# Patient Record
Sex: MALE | Race: WHITE | Employment: OTHER | URBAN - METROPOLITAN AREA
[De-identification: names, ages, dates, MRNs, and addresses within clinical notes are randomized per-mention and may not be internally consistent; named-entity substitution may affect disease eponyms.]

---

## 2019-09-23 ENCOUNTER — APPOINTMENT (OUTPATIENT)
Dept: CT IMAGING | Facility: CLINIC | Age: 44
End: 2019-09-23
Attending: EMERGENCY MEDICINE
Payer: COMMERCIAL

## 2019-09-23 ENCOUNTER — HOSPITAL ENCOUNTER (EMERGENCY)
Facility: CLINIC | Age: 44
Discharge: HOME | End: 2019-09-24
Attending: EMERGENCY MEDICINE | Admitting: EMERGENCY MEDICINE
Payer: COMMERCIAL

## 2019-09-23 VITALS
OXYGEN SATURATION: 97 % | DIASTOLIC BLOOD PRESSURE: 87 MMHG | BODY MASS INDEX: 25.92 KG/M2 | SYSTOLIC BLOOD PRESSURE: 134 MMHG | RESPIRATION RATE: 18 BRPM | WEIGHT: 175 LBS | HEIGHT: 69 IN | TEMPERATURE: 97.8 F

## 2019-09-23 DIAGNOSIS — S09.90XA INJURY OF HEAD, INITIAL ENCOUNTER: ICD-10-CM

## 2019-09-23 DIAGNOSIS — S06.0X1A CONCUSSION WITH LOSS OF CONSCIOUSNESS OF 30 MINUTES OR LESS, INITIAL ENCOUNTER: ICD-10-CM

## 2019-09-23 PROCEDURE — 99284 EMERGENCY DEPT VISIT MOD MDM: CPT | Mod: 25

## 2019-09-23 PROCEDURE — 70450 CT HEAD/BRAIN W/O DYE: CPT

## 2019-09-23 ASSESSMENT — MIFFLIN-ST. JEOR: SCORE: 1669.17

## 2019-09-23 NOTE — ED AVS SNAPSHOT
[facility]  [address]  [facility] [address]  Phone:  [phone]  Fax:  [phone]                                    Oleg Long   MRN: [id number]    Department:  [facility] Emergency Department   Date of Visit:  9/23/2019           After Visit Summary Signature Page    I have received my discharge instructions, and my questions have been answered. I have discussed any challenges I see with this plan with the nurse or doctor.    ..........................................................................................................................................  Patient/Patient Representative Signature      ..........................................................................................................................................  Patient Representative Print Name and Relationship to Patient    ..................................................               ................................................  Date                                   Time    ..........................................................................................................................................  Reviewed by Signature/Title    ...................................................              ..............................................  Date                                               Time          22EPIC Rev 08/18

## 2019-09-24 NOTE — ED PROVIDER NOTES
"  History   Chief Complaint:  Head Injury    HPI   Oleg Long is a 44 year old male, current smokeless tobacco user who presents for evaluation after a head injury. The patient reports that two days ago while playing rugby he was hit in the temple on the right side of his head by another player's knee. The patient was not wearing a helmet. He states that he lost consciousness for about one minute at that time. He did not play for the rest of the weekend and took time to rest. After the incident he endorses developing a headache and neck pain. The patient returned to work today but notes feeling foggy, dizzy, confused, and forgetful. The patient is most concerned by these cognitive symptoms, prompting his evaluation this evening. Initially after the injury the patient took ibuprofen for the pain but switched to Tylenol today.  Notably, the patient did sustain a mild concussion two weeks ago. He is a current user of chewing tobacco but denies alcohol or street drug use. The patient further denies vomiting, and focal numbness or tingling.    Allergies:  No known drug allergies    Medications:    Wellbutrin  Celexa    Past Medical History:    Depression  Concussion    Past Surgical History:    History reviewed. No pertinent surgical history.    Family History:    History reviewed. No pertinent family history.     Social History:  Smoking status: No  Smokeless tobacco: Current user  Alcohol use: No     Review of Systems   All other systems reviewed and are negative.    Physical Exam     Patient Vitals for the past 24 hrs:   BP Temp Temp src Heart Rate Resp SpO2 Height Weight   09/23/19 2300 134/87 -- -- -- -- 97 % -- --   09/23/19 2115 (!) 141/86 -- -- -- -- 98 % -- --   09/23/19 1931 (!) 137/100 97.8  F (36.6  C) Temporal 62 18 100 % 1.753 m (5' 9\") 79.4 kg (175 lb)     Physical Exam  General: Resting on the bed.  Head: No obvious trauma to head.  Ears, Nose, Throat:  External ears normal.  Nose normal.  Clear " TMs  Eyes:  Conjunctivae clear.  Pupils are equal, round, and reactive.   Neck: Normal range of motion.  Neck supple.  non tender c spine.    CV: Regular rate and rhythm.  No murmurs.      Respiratory: Effort normal and breath sounds normal.  No wheezing or crackles.   Gastrointestinal: Soft.  No distension. There is no tenderness.    Neuro: Alert. Moving all extremities appropriately.  Normal speech.  CN II-XII grossly intact, no pronator drift, normal finger-nose-finger, visual fields intact.  Gross muscle strength intact of the proximal and distal bilateral upper and lower extremities.  Sensation intact to light touch in all 4 extremities.  2+ patellar reflexes.  Normal gait.    Skin: Skin is warm and dry.  No rash noted.     Emergency Department Course   Imaging:  Radiographic findings were communicated with the patient who voiced understanding of the findings.    Head CT w/o Contrast:  Normal head CT.  As read by Radiology.    Emergency Department Course:  Past medical records, nursing notes, and vitals reviewed.   2210: I performed an exam of the patient and obtained history, as documented above.    The patient was sent for a head CT while in the emergency department, findings above.    1352: I rechecked the patient. Explained findings to the patient.    Findings and plan explained to the patient. Patient discharged home with instructions regarding supportive care, medications, and reasons to return. The importance of close follow-up was reviewed.     Impression & Plan    Medical Decision Making:  Oleg Long is a 44 year old male who presents after a head injury. The patient was playing rugby and was hit in the head by another player's knee. He did have a mild concussion two weeks prior. He presents due to increasing fogginess and forgetfulness. The differential diagnosis includes skull fracture, epidural hematoma, subdural hematoma, intracerebral hemorrhage, and traumatic subarachnoid hemorrhage; these  "diagnoses were not detected during this visit on CT imaging.  Able to clinically clear C-spine.  No other evidence of trauma on head to toe examination.  Despite the normal neuroimaging,  the patient understands that they must return if any \"red flags\" appear/develop in the coming hours/days, as this may represent an indication to perform another CT scan. I have discussed the second impact syndrome, and the importance of not sustaining repeated concussions in the next 1-2 weeks.  Concussion information will also be provided in writing at discharge detailing this. Patient was encouraged to maintain follow-up with the concussion clinic. Patient discharged.      Diagnosis:    ICD-10-CM   1. Concussion with loss of consciousness of 30 minutes or less, initial encounter S06.0X1A   2. Injury of head, initial encounter S09.90XA       Disposition:  Discharged to home        Som Mercedes  9/23/2019   [facility] EMERGENCY DEPARTMENT  I, Som Long, am serving as a scribe at 10:10 PM on 9/23/2019 to document services personally performed by Tamara Martinez MD based on my observations and the provider's statements to me.        Tamara Martinez MD  09/24/19 0140    "

## 2019-09-24 NOTE — ED TRIAGE NOTES
Patient reports playing rugby approx 4 days ago, was tackled opponent knee hit R temple. Reports LOC for approx 1 min. Since then having difficulty concentrating, forgetful and dizzy.    2 weeks prior to head injury- had another head injury. Concerned due to symptoms     Denies vomiting   ABC intact   A/o x4